# Patient Record
Sex: FEMALE | Race: WHITE | NOT HISPANIC OR LATINO | Employment: FULL TIME | URBAN - METROPOLITAN AREA
[De-identification: names, ages, dates, MRNs, and addresses within clinical notes are randomized per-mention and may not be internally consistent; named-entity substitution may affect disease eponyms.]

---

## 2017-05-11 ENCOUNTER — OFFICE VISIT (OUTPATIENT)
Dept: URGENT CARE | Facility: CLINIC | Age: 52
End: 2017-05-11
Payer: COMMERCIAL

## 2017-05-11 ENCOUNTER — HOSPITAL ENCOUNTER (OUTPATIENT)
Facility: MEDICAL CENTER | Age: 52
End: 2017-05-11
Attending: PHYSICIAN ASSISTANT
Payer: COMMERCIAL

## 2017-05-11 VITALS
TEMPERATURE: 99.2 F | HEIGHT: 67 IN | HEART RATE: 107 BPM | DIASTOLIC BLOOD PRESSURE: 72 MMHG | WEIGHT: 167 LBS | BODY MASS INDEX: 26.21 KG/M2 | RESPIRATION RATE: 16 BRPM | OXYGEN SATURATION: 97 % | SYSTOLIC BLOOD PRESSURE: 100 MMHG

## 2017-05-11 DIAGNOSIS — N89.8 VAGINAL DISCHARGE: ICD-10-CM

## 2017-05-11 LAB
APPEARANCE UR: NORMAL
BILIRUB UR STRIP-MCNC: NORMAL MG/DL
COLOR UR AUTO: NORMAL
GLUCOSE UR STRIP.AUTO-MCNC: NORMAL MG/DL
KETONES UR STRIP.AUTO-MCNC: NORMAL MG/DL
LEUKOCYTE ESTERASE UR QL STRIP.AUTO: NORMAL
NITRITE UR QL STRIP.AUTO: NORMAL
PH UR STRIP.AUTO: 6 [PH] (ref 5–8)
PROT UR QL STRIP: 30 MG/DL
RBC UR QL AUTO: NORMAL
SP GR UR STRIP.AUTO: 1.02
UROBILINOGEN UR STRIP-MCNC: NORMAL MG/DL

## 2017-05-11 PROCEDURE — 87660 TRICHOMONAS VAGIN DIR PROBE: CPT

## 2017-05-11 PROCEDURE — 87591 N.GONORRHOEAE DNA AMP PROB: CPT

## 2017-05-11 PROCEDURE — 99203 OFFICE O/P NEW LOW 30 MIN: CPT | Performed by: PHYSICIAN ASSISTANT

## 2017-05-11 PROCEDURE — 81002 URINALYSIS NONAUTO W/O SCOPE: CPT | Performed by: PHYSICIAN ASSISTANT

## 2017-05-11 PROCEDURE — 87480 CANDIDA DNA DIR PROBE: CPT

## 2017-05-11 PROCEDURE — 87491 CHLMYD TRACH DNA AMP PROBE: CPT

## 2017-05-11 PROCEDURE — 87510 GARDNER VAG DNA DIR PROBE: CPT

## 2017-05-11 ASSESSMENT — ENCOUNTER SYMPTOMS
CHILLS: 0
PALPITATIONS: 0
WHEEZING: 0
FLANK PAIN: 0
VOMITING: 0
FEVER: 0
CHANGE IN BOWEL HABIT: 0
NAUSEA: 0
SHORTNESS OF BREATH: 0
ABDOMINAL PAIN: 1
COUGH: 0
DIARRHEA: 0

## 2017-05-11 NOTE — MR AVS SNAPSHOT
"        Eden Austinos   2017 11:30 AM   Office Visit   MRN: [id number]    Department:  [facility]   Dept Phone:  [phone]    Description:  Female : 1965   Provider:  Huy Peña PA-C           Reason for Visit     Exposure to STD pressure and spouse having multiple partners, now feeling was exposed to std's, pre-menopausal      Allergies as of 2017     Allergen Noted Reactions    Penicillins 2011   Rash      You were diagnosed with     Vaginal discharge   [2015]         Vital Signs     Blood Pressure Pulse Temperature Respirations Height Weight    100/72 mmHg 107 37.3 °C (99.2 °F) 16 1.702 m (5' 7\") 75.751 kg (167 lb)    Body Mass Index Oxygen Saturation Smoking Status             26.15 kg/m2 97% Never Smoker          Basic Information     Date Of Birth Sex Race Ethnicity Preferred Language    1965 Female White Non- English      Problem List              ICD-10-CM Priority Class Noted - Resolved    Anxiety F41.9   2011 - Present    Glucose intolerance (impaired glucose tolerance) R73.02   2011 - Present    Preventative health care Z00.00   2011 - Present    Kidney stones N20.0   Unknown - Present    Hyperlipidemia LDL goal <130 E78.5   Unknown - Present    Skin cancer C44.90   Unknown - Present      Health Maintenance        Date Due Completion Dates    MAMMOGRAM 2007 ---    PAP SMEAR 2019, 2012    IMM DTaP/Tdap/Td Vaccine (2 - Td) 2021            Results     POCT Urinalysis      Component Value Standard Range & Units    POC Color D.YELOW Negative    POC Appearance CLOUDY Negative    POC Leukocyte Esterase NEG Negative    POC Nitrites NEG Negative    POC Urobiligen NEG Negative (0.2) mg/dL    POC Protein 30 Negative mg/dL    POC Urine PH 6.0 5.0 - 8.0    POC Blood TRACE Negative    POC Specific Gravity 1.025 <1.005 - >1.030    POC Ketones SMALL Negative mg/dL    POC Biliruben NEG Negative mg/dL    POC " Glucose NEG Negative mg/dL                        Current Immunizations     Tdap Vaccine 12/28/2011 11:50 AM      Below and/or attached are the medications your provider expects you to take. Review all of your home medications and newly ordered medications with your provider and/or pharmacist. Follow medication instructions as directed by your provider and/or pharmacist. Please keep your medication list with you and share with your provider. Update the information when medications are discontinued, doses are changed, or new medications (including over-the-counter products) are added; and carry medication information at all times in the event of emergency situations     Allergies:  PENICILLINS - Rash               Medications  Valid as of: May 11, 2017 - 12:32 PM    Generic Name Brand Name Tablet Size Instructions for use    LORazepam (Tab) ATIVAN 0.5 MG Take 1 Tab by mouth every 12 hours as needed for Anxiety. May cause sedation        .                 Medicines prescribed today were sent to:     [facility] [address] [place] [address], [place] - [address]    [address] [place] [address]    Phone: [phone] Fax: [phone]    Open 24 Hours?: No      Medication refill instructions:       If your prescription bottle indicates you have medication refills left, it is not necessary to call your provider’s office. Please contact your pharmacy and they will refill your medication.    If your prescription bottle indicates you do not have any refills left, you may request refills at any time through one of the following ways: The online enavu system (except Urgent Care), by calling your provider’s office, or by asking your pharmacy to contact your provider’s office with a refill request. Medication refills are processed only during regular business hours and may not be available until the next business day. Your provider may request additional information or to have a follow-up visit with you prior to refilling  your medication.   *Please Note: Medication refills are assigned a new Rx number when refilled electronically. Your pharmacy may indicate that no refills were authorized even though a new prescription for the same medication is available at the pharmacy. Please request the medicine by name with the pharmacy before contacting your provider for a refill.        Your To Do List     Future Labs/Procedures Complete By Expires    CHLAMYDIA/GC PCR URINE OR SWAB  As directed 5/11/2018    VAGINAL PATHOGENS DNA PANEL  As directed 5/11/2018         [employer] Access Code: Activation code not generated  Current [employer] Status: Active

## 2017-05-11 NOTE — PROGRESS NOTES
Subjective:      Eden Cohen is a 51 y.o. female who presents with Exposure to STD            Exposure to STD  This is a new problem. The current episode started yesterday. The problem occurs constantly. The problem has been gradually worsening. Associated symptoms include abdominal pain (Rash) and urinary symptoms. Pertinent negatives include no change in bowel habit, chest pain, chills, coughing, fever, nausea or vomiting.   Patient just found out that her  has been cheating on her for the last several months. She denies any known STD exposure however she wishes to be checked. She states she has had a yellow discharge as well as some lower abdominal pressure, however she does not know if this is normal.      PMH:  has a past medical history of Anxiety; Kidney stones; Skin cancer; Hyperlipidemia LDL goal <130; and Impaired fasting glucose.  MEDS:   Current outpatient prescriptions:   •  lorazepam (ATIVAN) 0.5 MG Tab, Take 1 Tab by mouth every 12 hours as needed for Anxiety. May cause sedation, Disp: 30 Tab, Rfl: 0  ALLERGIES:   Allergies   Allergen Reactions   • Penicillins Rash     SURGHX:   Past Surgical History   Procedure Laterality Date   • Primary c section       x1   • Tonsillectomy and adenoidectomy     • Tubal coagulation laparoscopic bilateral  1/16/2013     Performed by Supriya Rodriguez M.D. at [facility]   • Hysteroscopy thermal ablation  1/16/2013     Performed by Supriya Rodriguez M.D. at [facility]     SOCHX:  reports that she has never smoked. She has never used smokeless tobacco. She reports that she does not drink alcohol or use illicit drugs.  FH: family history includes Cancer in her paternal grandmother; Diabetes in her paternal grandfather; Heart Disease in her maternal grandmother.      Review of Systems   Constitutional: Negative for fever and chills.   Respiratory: Negative for cough, shortness of breath and wheezing.   "  Cardiovascular: Negative for chest pain, palpitations and leg swelling.   Gastrointestinal: Positive for abdominal pain (Rash). Negative for nausea, vomiting, diarrhea and change in bowel habit.   Genitourinary: Positive for dysuria. Negative for urgency, frequency, hematuria and flank pain.        Yellow Discharge - odor       Medications, Allergies, and current problem list reviewed today in Epic     Objective:     /72 mmHg  Pulse 107  Temp(Src) 37.3 °C (99.2 °F)  Resp 16  Ht 1.702 m (5' 7\")  Wt 75.751 kg (167 lb)  BMI 26.15 kg/m2  SpO2 97%     Physical Exam   Constitutional: She is oriented to person, place, and time. She appears well-developed and well-nourished. No distress.   HENT:   Head: Normocephalic and atraumatic.   Eyes: Conjunctivae and EOM are normal.   Neck: Normal range of motion. Neck supple.   Cardiovascular: Normal rate, regular rhythm and normal heart sounds.    Pulmonary/Chest: Effort normal and breath sounds normal. No respiratory distress. She has no wheezes.   Genitourinary: Pelvic exam was performed with patient supine. There is no rash, tenderness or lesion on the right labia. There is no rash, tenderness or lesion on the left labia. Cervix exhibits no motion tenderness and no friability. No erythema, tenderness or bleeding in the vagina. No signs of injury around the vagina. Vaginal discharge (scant amount of white discharge.) found.   Neurological: She is alert and oriented to person, place, and time.   Skin: Skin is warm and dry. She is not diaphoretic.   Psychiatric: She has a normal mood and affect. Her behavior is normal. Judgment and thought content normal.   Nursing note and vitals reviewed.         Urinalysis: Negative     Assessment/Plan:     1. Vaginal discharge  POCT Urinalysis    VAGINAL PATHOGENS DNA PANEL    CHLAMYDIA/GC PCR URINE OR SWAB     All testing in urgent care negative. Spoke to patient the phone and informed her that in order to receive a full STD " panel she will need blood drawn which has to be done by her primary care provider.   She states she will follow up with her PCP for further testing.  Return to clinic or go to ED if symptoms worsen or persist. Indications for ED discussed at length. Patient voices understanding. Follow-up with your primary care provider in 3-5 days. Red flags discussed.    Please note that this dictation was created using voice recognition software. I have made every reasonable attempt to correct obvious errors, but I expect that there are errors of grammar and possibly content that I did not discover before finalizing the note.

## 2017-05-12 LAB
C TRACH DNA SPEC QL NAA+PROBE: NEGATIVE
CANDIDA DNA VAG QL PROBE+SIG AMP: NEGATIVE
G VAGINALIS DNA VAG QL PROBE+SIG AMP: NEGATIVE
N GONORRHOEA DNA SPEC QL NAA+PROBE: NEGATIVE
SPECIMEN SOURCE: NORMAL
T VAGINALIS DNA VAG QL PROBE+SIG AMP: NEGATIVE

## 2017-07-06 ENCOUNTER — TELEPHONE (OUTPATIENT)
Dept: MEDICAL GROUP | Facility: MEDICAL CENTER | Age: 52
End: 2017-07-06

## 2017-07-11 ENCOUNTER — OFFICE VISIT (OUTPATIENT)
Dept: MEDICAL GROUP | Facility: MEDICAL CENTER | Age: 52
End: 2017-07-11
Payer: COMMERCIAL

## 2017-07-11 VITALS
HEART RATE: 77 BPM | DIASTOLIC BLOOD PRESSURE: 60 MMHG | SYSTOLIC BLOOD PRESSURE: 80 MMHG | HEIGHT: 67 IN | OXYGEN SATURATION: 98 % | TEMPERATURE: 98.2 F | WEIGHT: 141 LBS | BODY MASS INDEX: 22.13 KG/M2

## 2017-07-11 DIAGNOSIS — F43.9 STRESS AT HOME: ICD-10-CM

## 2017-07-11 PROCEDURE — 99213 OFFICE O/P EST LOW 20 MIN: CPT | Performed by: NURSE PRACTITIONER

## 2017-07-11 NOTE — MR AVS SNAPSHOT
"        Eden Cohen   2017 1:15 PM   Office Visit   MRN: [id number]    Department:  [facility]   Dept Phone:  [phone]    Description:  Female : 1965   Provider:  NICKY Marrero           Allergies as of 2017     Allergen Noted Reactions    Penicillins 2011   Rash      You were diagnosed with     Stress at home   [[id number]]   refer to psychiatrist for further info and help pt in deciding what to do with family stress      Vital Signs     Blood Pressure Pulse Temperature Height Weight Body Mass Index    80/60 mmHg 77 36.8 °C (98.2 °F) 1.702 m (5' 7\") 63.957 kg (141 lb) 22.08 kg/m2    Oxygen Saturation Breastfeeding? Smoking Status             98% No Never Smoker          Basic Information     Date Of Birth Sex Race Ethnicity Preferred Language    1965 Female White Non- English      Problem List              ICD-10-CM Priority Class Noted - Resolved    Anxiety F41.9   2011 - Present    Glucose intolerance (impaired glucose tolerance) R73.02   2011 - Present    Preventative health care Z00.00   2011 - Present    Kidney stones N20.0   Unknown - Present    Hyperlipidemia LDL goal <130 E78.5   Unknown - Present    Skin cancer C44.90   Unknown - Present      Health Maintenance        Date Due Completion Dates    MAMMOGRAM 2007 ---    IMM INFLUENZA (1) 2017 ---    PAP SMEAR 2019, 2012    IMM DTaP/Tdap/Td Vaccine (2 - Td) 2021            Current Immunizations     Tdap Vaccine 2011 11:50 AM      Below and/or attached are the medications your provider expects you to take. Review all of your home medications and newly ordered medications with your provider and/or pharmacist. Follow medication instructions as directed by your provider and/or pharmacist. Please keep your medication list with you and share with your provider. Update the information when medications are discontinued, doses are changed, or " new medications (including over-the-counter products) are added; and carry medication information at all times in the event of emergency situations     Allergies:  PENICILLINS - Rash               Medications  Valid as of: July 11, 2017 -  2:24 PM    Generic Name Brand Name Tablet Size Instructions for use    LORazepam (Tab) ATIVAN 0.5 MG Take 1 Tab by mouth every 12 hours as needed for Anxiety. May cause sedation        .                 Medicines prescribed today were sent to:     [facility] [address] [place] [address], [place] - [address]    [address] [place] [address]    Phone: [phone] Fax: [phone]    Open 24 Hours?: No      Medication refill instructions:       If your prescription bottle indicates you have medication refills left, it is not necessary to call your provider’s office. Please contact your pharmacy and they will refill your medication.    If your prescription bottle indicates you do not have any refills left, you may request refills at any time through one of the following ways: The online rubberit system (except Urgent Care), by calling your provider’s office, or by asking your pharmacy to contact your provider’s office with a refill request. Medication refills are processed only during regular business hours and may not be available until the next business day. Your provider may request additional information or to have a follow-up visit with you prior to refilling your medication.   *Please Note: Medication refills are assigned a new Rx number when refilled electronically. Your pharmacy may indicate that no refills were authorized even though a new prescription for the same medication is available at the pharmacy. Please request the medicine by name with the pharmacy before contacting your provider for a refill.        Referral     A referral request has been sent to our patient care coordination department. Please allow 3-5 business days for us to process this request and  contact you either by phone or mail. If you do not hear from us by the 5th business day, please call us at [phone].           [employer] Access Code: Activation code not generated  Current [employer] Status: Active

## 2017-07-11 NOTE — PROGRESS NOTES
Subjective:      Eden Cohen is a 51 y.o. female who presents with No chief complaint on file.            HPI  Seen in f/u for anxiety.  She is very concerned that her  is suicidal. He is hearing voices.  He feels that he has PTSD.  He has told her that the voices have told him that he needs to kill himself in front of pt.  They are seeing a counselor but she hasn't done anything about hearing the voices.    Her daughter has a severe learning disability. Pt is afraid if her  attacks verbally her daughter it will really call her issues.    She is very worried about what to do for  and protect her children.  He has  Mostly left the house.  Doesn't want to be around anyone.      Patient Active Problem List    Diagnosis Date Noted   • Hyperlipidemia LDL goal <130    • Skin cancer    • Anxiety 12/28/2011   • Glucose intolerance (impaired glucose tolerance) 12/28/2011   • Preventative health care 12/28/2011   • Kidney stones      Current Outpatient Prescriptions   Medication Sig Dispense Refill   • lorazepam (ATIVAN) 0.5 MG Tab Take 1 Tab by mouth every 12 hours as needed for Anxiety. May cause sedation 30 Tab 0     No current facility-administered medications for this visit.     Allergies   Allergen Reactions   • Penicillins Rash       ROS  Review of Systems   Constitutional: Negative.  Negative for fever, chills, weight loss, malaise/fatigue and diaphoresis.   HENT: Negative.  Negative for hearing loss, ear pain, nosebleeds, congestion, sore throat, neck pain, tinnitus and ear discharge.    Respiratory: Negative.  Negative for cough, hemoptysis, sputum production, shortness of breath, wheezing and stridor.    Cardiovascular: Negative.  Negative for chest pain, palpitations, orthopnea, claudication, leg swelling and PND.   Gastrointestinal: denies nausea, vomiting, diarrhea, constipation, heartburn, melena or hematochezia.  Genitourinary: Denies dysuria, hematuria, urinary incontinence, frequency  or urgency.             Objective:     There were no vitals taken for this visit.     Physical Exam      Physical Exam   Vitals reviewed.  Constitutional: oriented to person, place, and time. appears well-developed and well-nourished. No distress.   Pulmonary/Chest: No stridor. No respiratory distress.    Musculoskeletal: Normal range of motion. Neurological: alert and oriented to person, place, and time. exhibits normal muscle tone. Coordination normal.   Skin: Skin is warm and dry. no diaphoresis.   Psychiatric: normal mood and affect. behavior is normal.            Assessment/Plan:     1. Stress at home  REFERRAL TO PSYCHIATRY    refer to psychiatrist for further info and help pt in deciding what to do with family stress     2.  F/u prn